# Patient Record
Sex: FEMALE | RURAL
[De-identification: names, ages, dates, MRNs, and addresses within clinical notes are randomized per-mention and may not be internally consistent; named-entity substitution may affect disease eponyms.]

---

## 2024-09-11 ENCOUNTER — APPOINTMENT (RX ONLY)
Dept: RURAL CLINIC 1 | Facility: CLINIC | Age: 89
Setting detail: DERMATOLOGY
End: 2024-09-11

## 2024-09-11 DIAGNOSIS — R59.0 LOCALIZED ENLARGED LYMPH NODES: ICD-10-CM

## 2024-09-11 DIAGNOSIS — B02.29 OTHER POSTHERPETIC NERVOUS SYSTEM INVOLVEMENT: ICD-10-CM | Status: INADEQUATELY CONTROLLED

## 2024-09-11 PROBLEM — D48.5 NEOPLASM OF UNCERTAIN BEHAVIOR OF SKIN: Status: ACTIVE | Noted: 2024-09-11

## 2024-09-11 PROCEDURE — ? PRESCRIPTION

## 2024-09-11 PROCEDURE — 99204 OFFICE O/P NEW MOD 45 MIN: CPT

## 2024-09-11 PROCEDURE — ? COUNSELING

## 2024-09-11 PROCEDURE — ? TREATMENT REGIMEN

## 2024-09-11 PROCEDURE — ? ORDER TESTS

## 2024-09-11 RX ORDER — MOMETASONE FUROATE 1 MG/ML
SOLUTION TOPICAL
Qty: 1 | Refills: 4 | Status: ERX | COMMUNITY
Start: 2024-09-11

## 2024-09-11 RX ORDER — MOMETASONE FUROATE 1 MG/G
CREAM TOPICAL
Qty: 45 | Refills: 5 | Status: ERX | COMMUNITY
Start: 2024-09-11

## 2024-09-11 RX ADMIN — MOMETASONE FUROATE: 1 SOLUTION TOPICAL at 00:00

## 2024-09-11 RX ADMIN — MOMETASONE FUROATE: 1 CREAM TOPICAL at 00:00

## 2024-09-11 ASSESSMENT — LOCATION ZONE DERM
LOCATION ZONE: NECK
LOCATION ZONE: SCALP

## 2024-09-11 ASSESSMENT — LOCATION SIMPLE DESCRIPTION DERM
LOCATION SIMPLE: POSTERIOR SCALP
LOCATION SIMPLE: POSTERIOR NECK
LOCATION SIMPLE: NECK

## 2024-09-11 ASSESSMENT — LOCATION DETAILED DESCRIPTION DERM
LOCATION DETAILED: MID POSTERIOR NECK
LOCATION DETAILED: LEFT SUPERIOR LATERAL NECK
LOCATION DETAILED: RIGHT LATERAL TRAPEZIAL NECK
LOCATION DETAILED: MID-OCCIPITAL SCALP

## 2024-09-11 NOTE — PROCEDURE: TREATMENT REGIMEN
Plan: Patient presents with her daughter today and they state that in February the patient broke out in what was diagnosed as shingles. She was treated appropriately with antivirals for one week and then the rash improved. However, there has been persistent pain that has been going on since February. She presented to her primary care physician and was given medicine for nerve pain. However, the patient does not feel that it is very effective at all. \\n\\nClinical exam of the patient exhibits a lot of secondary findings due to the amount of scratching that she’s been doing of her posterior scalp and the right posterior shoulder. Discussed with the patient that if the medications are not working then it is time to see a neurologist. The patient states that she has an insurance that is not covered well in [facility] so they will check the book that they have at home to see if there are any neurologists that accept their insurance in town or nearby.
Detail Level: Detailed

## 2024-09-11 NOTE — HPI: RASH
Is This A New Presentation, Or A Follow-Up?: Rash
Additional History: She was dx with shingles by urgent care. She was on acyclovir x 1 week. It started in Feb with nerve pain on face. She can’t get the shingles vaccine.